# Patient Record
Sex: MALE | Race: WHITE | Employment: UNEMPLOYED | URBAN - METROPOLITAN AREA
[De-identification: names, ages, dates, MRNs, and addresses within clinical notes are randomized per-mention and may not be internally consistent; named-entity substitution may affect disease eponyms.]

---

## 2017-04-18 ENCOUNTER — OFFICE VISIT (OUTPATIENT)
Dept: FAMILY MEDICINE CLINIC | Age: 57
End: 2017-04-18

## 2017-04-18 VITALS
SYSTOLIC BLOOD PRESSURE: 155 MMHG | DIASTOLIC BLOOD PRESSURE: 87 MMHG | HEART RATE: 66 BPM | BODY MASS INDEX: 29.42 KG/M2 | WEIGHT: 223 LBS | TEMPERATURE: 98.3 F

## 2017-04-18 DIAGNOSIS — S99.921A RIGHT FOOT INJURY, INITIAL ENCOUNTER: Primary | ICD-10-CM

## 2017-04-18 NOTE — PROGRESS NOTES
Subjective:     Chief Complaint   Patient presents with    Foot Pain     pt c/o pain on right foot due to injury        He  is a 55 y.o. male who presents for evaluation of R foot pain s/p steel pipe that hit his foot approx 4-5 days ago after he   Ran his foot into it. Since then, Pt has noted a small knot that has formed on the top part of his foot. Notes swelling has improved. Pt has been taking Aleve 440mg TID and doing Epsom foot soaks. Pt notes normal walking is painful and has been walking on his heels. Notes Hx of plantar fascitis where he received injections, no prior surgeries on foot. PMH: a pinched nerve in his back     Surg: see CC     NKDA     ROS  Gen - no fever/chills  Resp - no dyspnea or cough  CV - no chest pain or WALKER  Rest per HPI    Past Medical History:   Diagnosis Date    Arthritis     Chronic pain      Past Surgical History:   Procedure Laterality Date    ABDOMEN SURGERY PROC UNLISTED      HX ENDOSCOPY      HX HEENT      FACE-TRAUMA    HX OTHER SURGICAL      inguinal hernia     Current Outpatient Prescriptions on File Prior to Visit   Medication Sig Dispense Refill    predniSONE (STERAPRED DS) 10 mg dose pack Take as directed, start tomorrow 21 Tab 0    albuterol (VENTOLIN HFA) 90 mcg/actuation inhaler Take 2 Puffs by inhalation every six (6) hours as needed (until cough resolves). 1 Inhaler 0    cyanocobalamin 1,000 mcg tablet Take 1,000 mcg by mouth daily. No current facility-administered medications on file prior to visit.          Objective:     Vitals:    04/18/17 1026 04/18/17 1030   BP: (!) 151/100 155/87   Pulse: 67 66   Temp: 98.3 °F (36.8 °C)    TempSrc: Oral    Weight: 223 lb (101.2 kg)        Physical Examination:  General appearance - alert, well appearing, and in no distress  Eyes -sclera anicteric  Neck - supple, no significant adenopathy, no thyromegaly  Chest - clear to auscultation, no wheezes, rales or rhonchi, symmetric air entry  Heart - normal rate, regular rhythm, normal S1, S2, no murmurs, rubs, clicks or gallops  Neurological - alert, oriented, no focal findings or movement disorder noted  R foot-ROM WNL, trace edema, 5cm hematoma noted on dorsal side, tender to palpation. Assessment/ Plan:   Patria Eli was seen today for foot pain. Diagnoses and all orders for this visit:    Right foot injury, initial encounter  -     XR FOOT RT MIN 3 V; Future       Discussed appropriate Naproxen dosing and corresponding effects on BP. Given type of injury and continued pain/weight bearing status, will refer Pt for plain film to rule out Fx. Discussed RICE and non-pharm symptom management. RTC PRN. I have discussed the diagnosis with the patient and the intended plan as seen in the above orders. The patient has received an after-visit summary and questions were answered concerning future plans. I have discussed medication side effects and warnings with the patient as well. The patient verbalizes understanding and agreement with the plan. Follow-up Disposition:  Return if symptoms worsen or fail to improve.

## 2017-04-18 NOTE — PROGRESS NOTES
Statements below were documented by Odilon Oswald RN Patient discharged home with AVS. No further questions. Information given regarding test ordered x-ray of right foot , address to [facility] given , and copy of order for test . Instructed to go to [facility] main entrance and then report to outpatient registration Patient given information about care card because the test ordered will be billed to patient . Patient explained to answer phone because a call would be coming in from [facility] to start the financial screening process fro Care Card. Patient verbalized understanding and has no questions . Patient explained that when bill is received to please call the number on the bill and explain that they are working on getting the care card. Patient acknowleged and understands the process without aany questions . Patient informed that if [facility] does not call to contact them in 3 weeks  to please give them a call . Acknowledged and verbalized understanding of information discussed at discharge .  Odilon Oswald RN

## 2017-05-03 ENCOUNTER — TELEPHONE (OUTPATIENT)
Dept: FAMILY MEDICINE CLINIC | Age: 57
End: 2017-05-03

## 2017-05-03 NOTE — TELEPHONE ENCOUNTER
Per Leocadia Flight, I called patient and told him he would have to come to the [facility] to be seen before we could sign off on paperwork.     Tennis Driscoll April

## 2017-05-03 NOTE — TELEPHONE ENCOUNTER
Patient said he is feeling much better,  Symptoms have gone. He needs to have Patrick complete a form, which he is going to fax to us saying he is okay to work.     Judit Atkins

## 2017-05-05 ENCOUNTER — OFFICE VISIT (OUTPATIENT)
Dept: FAMILY MEDICINE CLINIC | Age: 57
End: 2017-05-05

## 2017-05-05 VITALS
TEMPERATURE: 97.9 F | HEART RATE: 94 BPM | BODY MASS INDEX: 29.03 KG/M2 | SYSTOLIC BLOOD PRESSURE: 156 MMHG | DIASTOLIC BLOOD PRESSURE: 82 MMHG | WEIGHT: 220 LBS

## 2017-05-05 DIAGNOSIS — S99.921D FOOT INJURY, RIGHT, SUBSEQUENT ENCOUNTER: Primary | ICD-10-CM

## 2017-05-05 DIAGNOSIS — Z72.0 TOBACCO USE: ICD-10-CM

## 2017-05-05 DIAGNOSIS — I10 ESSENTIAL HYPERTENSION: ICD-10-CM

## 2017-05-05 NOTE — PATIENT INSTRUCTIONS
Learning About Benefits From Quitting Smoking  How does quitting smoking make you healthier? If you're thinking about quitting smoking, you may have a few reasons to be smoke-free. Your health may be one of them. · When you quit smoking, you lower your risks for cancer, lung disease, heart attack, stroke, blood vessel disease, and blindness from macular degeneration. · When you're smoke-free, you get sick less often, and you heal faster. You are less likely to get colds, flu, bronchitis, and pneumonia. · As a nonsmoker, you may find that your mood is better and you are less stressed. When and how will you feel healthier? Quitting has real health benefits that start from day 1 of being smoke-free. And the longer you stay smoke-free, the healthier you get and the better you feel. The first hours  · After just 20 minutes, your blood pressure and heart rate go down. That means there's less stress on your heart and blood vessels. · Within 12 hours, the level of carbon monoxide in your blood drops back to normal. That makes room for more oxygen. With more oxygen in your body, you may notice that you have more energy than when you smoked. After 2 weeks  · Your lungs start to work better. · Your risk of heart attack starts to drop. After 1 month  · When your lungs are clear, you cough less and breathe deeper, so it's easier to be active. · Your sense of taste and smell return. That means you can enjoy food more than you have since you started smoking. Over the years  · After 1 year, your risk of heart disease is half what it would be if you kept smoking. · After 5 years, your risk of stroke starts to shrink. Within a few years after that, it's about the same as if you'd never smoked. · After 10 years, your risk of dying from lung cancer is cut by about half. And your risk for many other types of cancer is lower too. How would quitting help others in your life?   When you quit smoking, you improve the health of everyone who now breathes in your smoke. · Their heart, lung, and cancer risks drop, much like yours. · They are sick less. For babies and small children, living smoke-free means they're less likely to have ear infections, pneumonia, and bronchitis. · If you're a woman who is or will be pregnant someday, quitting smoking means a healthier . · Children who are close to you are less likely to become adult smokers. Where can you learn more? Go to http://karli-elenita.info/. Enter 052 [id number] 72 11 in the search box to learn more about \"Learning About Benefits From Quitting Smoking. \"  Current as of: May 26, 2016  Content Version: 11.2  © 8954-4591 [employer]. Care instructions adapted under license by [employer] (which disclaims liability or warranty for this information). If you have questions about a medical condition or this instruction, always ask your healthcare professional. [place] [address] any warranty or liability for your use of this information. DASH Diet: Care Instructions  Your Care Instructions  The DASH diet is an eating plan that can help lower your blood pressure. DASH stands for Dietary Approaches to Stop Hypertension. Hypertension is high blood pressure. The DASH diet focuses on eating foods that are high in calcium, potassium, and magnesium. These nutrients can lower blood pressure. The foods that are highest in these nutrients are fruits, vegetables, low-fat dairy products, nuts, seeds, and legumes. But taking calcium, potassium, and magnesium supplements instead of eating foods that are high in those nutrients does not have the same effect. The DASH diet also includes whole grains, fish, and poultry. The DASH diet is one of several lifestyle changes your doctor may recommend to lower your high blood pressure. Your doctor may also want you to decrease the amount of sodium in your diet.  Lowering sodium while following the DASH diet can lower blood pressure even further than just the DASH diet alone. Follow-up care is a key part of your treatment and safety. Be sure to make and go to all appointments, and call your doctor if you are having problems. It's also a good idea to know your test results and keep a list of the medicines you take. How can you care for yourself at home? Following the DASH diet  · Eat 4 to 5 servings of fruit each day. A serving is 1 medium-sized piece of fruit, ½ cup chopped or canned fruit, 1/4 cup dried fruit, or 4 ounces (½ cup) of fruit juice. Choose fruit more often than fruit juice. · Eat 4 to 5 servings of vegetables each day. A serving is 1 cup of lettuce or raw leafy vegetables, ½ cup of chopped or cooked vegetables, or 4 ounces (½ cup) of vegetable juice. Choose vegetables more often than vegetable juice. · Get 2 to 3 servings of low-fat and fat-free dairy each day. A serving is 8 ounces of milk, 1 cup of yogurt, or 1 ½ ounces of cheese. · Eat 6 to 8 servings of grains each day. A serving is 1 slice of bread, 1 ounce of dry cereal, or ½ cup of cooked rice, pasta, or cooked cereal. Try to choose whole-grain products as much as possible. · Limit lean meat, poultry, and fish to 2 servings each day. A serving is 3 ounces, about the size of a deck of cards. · Eat 4 to 5 servings of nuts, seeds, and legumes (cooked dried beans, lentils, and split peas) each week. A serving is 1/3 cup of nuts, 2 tablespoons of seeds, or ½ cup of cooked beans or peas. · Limit fats and oils to 2 to 3 servings each day. A serving is 1 teaspoon of vegetable oil or 2 tablespoons of salad dressing. · Limit sweets and added sugars to 5 servings or less a week. A serving is 1 tablespoon jelly or jam, ½ cup sorbet, or 1 cup of lemonade. · Eat less than 2,300 milligrams (mg) of sodium a day. If you limit your sodium to 1,500 mg a day, you can lower your blood pressure even more. Tips for success  · Start small.  Do not try to make dramatic changes to your diet all at once. You might feel that you are missing out on your favorite foods and then be more likely to not follow the plan. Make small changes, and stick with them. Once those changes become habit, add a few more changes. · Try some of the following:  ¨ Make it a goal to eat a fruit or vegetable at every meal and at snacks. This will make it easy to get the recommended amount of fruits and vegetables each day. ¨ Try yogurt topped with fruit and nuts for a snack or healthy dessert. ¨ Add lettuce, tomato, cucumber, and onion to sandwiches. ¨ Combine a ready-made pizza crust with low-fat mozzarella cheese and lots of vegetable toppings. Try using tomatoes, squash, spinach, broccoli, carrots, cauliflower, and onions. ¨ Have a variety of cut-up vegetables with a low-fat dip as an appetizer instead of chips and dip. ¨ Sprinkle sunflower seeds or chopped almonds over salads. Or try adding chopped walnuts or almonds to cooked vegetables. ¨ Try some vegetarian meals using beans and peas. Add garbanzo or kidney beans to salads. Make burritos and tacos with mashed toro beans or black beans. Where can you learn more? Go to http://karli-elenita.info/. Enter F601 in the search box to learn more about \"DASH Diet: Care Instructions. \"  Current as of: March 23, 2016  Content Version: 11.2  © 9059-9477 [employer]. Care instructions adapted under license by [employer] (which disclaims liability or warranty for this information). If you have questions about a medical condition or this instruction, always ask your healthcare professional. [place] [address] any warranty or liability for your use of this information.

## 2017-05-05 NOTE — PROGRESS NOTES
The following was performed at discharge station per provider:    AVS printed, reviewed with pt and given to pt. Copied [place] [employer] form for scanning to the chart. Referred pt to the registrar to make an appt for follow up in 6-8 weeks, and reminded pt to come one week prior to this appt for fasting labs. Pt stated that he hopes to have a job with insurance soon so he does not anticipate coming back to [facility]. Pt expressed understanding of the above information. Fernando Bettencourt.

## 2017-05-05 NOTE — PROGRESS NOTES
Subjective:     Chief Complaint   Patient presents with    Employment Physical        He  is a 55 y.o. male who presents for evaluation of R foot pain and employment physical.     Since LOV, Pt notes hisR foot pain and function has improved x one week. 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 Has brought a form he needs filled out for unemployment stating he can work. No Hx of HTN Tx. Continues to smoke approx 1/2 PPD. May get insurance, has several job interviews lined up. ROS  Gen - no fever/chills  Resp - no dyspnea or cough  CV - no chest pain or WALKER  Rest per HPI    Past Medical History:   Diagnosis Date    Arthritis     Chronic pain      Past Surgical History:   Procedure Laterality Date    ABDOMEN SURGERY PROC UNLISTED      HX ENDOSCOPY      HX HEENT      FACE-TRAUMA    HX OTHER SURGICAL      inguinal hernia     Current Outpatient Prescriptions on File Prior to Visit   Medication Sig Dispense Refill    albuterol (VENTOLIN HFA) 90 mcg/actuation inhaler Take 2 Puffs by inhalation every six (6) hours as needed (until cough resolves). 1 Inhaler 0    cyanocobalamin 1,000 mcg tablet Take 1,000 mcg by mouth daily. No current facility-administered medications on file prior to visit.          Objective:     Vitals:    05/05/17 0849   BP: 156/82   Pulse: 94   Temp: 97.9 °F (36.6 °C)   TempSrc: Oral   Weight: 220 lb (99.8 kg)       Physical Examination:  General appearance - alert, well appearing, and in no distress  Eyes -sclera anicteric  Neck - supple, no significant adenopathy, no thyromegaly  Chest - clear to auscultation, no wheezes, rales or rhonchi, symmetric air entry  Heart - normal rate, regular rhythm, normal S1, S2, no murmurs, rubs, clicks or gallops  Neurological - alert, oriented, no focal findings or movement disorder noted    R foot-ROM WNL, no edema, no palpable abnormalities, gait WNL      Assessment/ Plan:   Aydee Barnes was seen today for employment physical.    Diagnoses and all orders for this visit:    Foot injury, right, subsequent encounter    Essential hypertension     Completed form stating Pt could work given his improvement, exam and Hx. Recommend Pt check BP at home BID x 1-2 weeks, bring reading to next visit. Start daily ASA and counseled on lowering CV risk w/ diet and lifestyle changes. RTC 1 week prior to f/u visit for fasting labs. Labs in 2015 showed pre-DM, normal renal function. Advised to cancel appt if his insurance status changes. Pt expressed understanding of the treatment plan and repeated back instructions for medications, labs  and follow-up. I have discussed the diagnosis with the patient and the intended plan as seen in the above orders. The patient has received an after-visit summary and questions were answered concerning future plans. I have discussed medication side effects and warnings with the patient as well. The patient verbalizes understanding and agreement with the plan.     Follow-up Disposition: Not on File

## 2017-05-05 NOTE — PROGRESS NOTES
Coordination of Care  1. Have you been to the ER, urgent care clinic since your last visit? Hospitalized since your last visit? No    2. Have you seen or consulted any other health care providers outside of the [facility] since your last visit? Include any pap smears or colon screening. No    Medications  Medication Reconciliation Performed: no  Patient does need refills     Learning Assessment Complete?  yes

## 2017-05-08 NOTE — PROGRESS NOTES
Completed form faxed to Mrs Yasmin Kwong @[phone] from [facility] office . Pt notified via phone call and states he also did fax on Friday after the office visit.

## 2017-06-27 ENCOUNTER — HOSPITAL ENCOUNTER (OUTPATIENT)
Dept: LAB | Age: 57
Discharge: HOME | End: 2017-06-27

## 2017-06-27 ENCOUNTER — OFFICE VISIT (OUTPATIENT)
Dept: FAMILY MEDICINE CLINIC | Age: 57
End: 2017-06-27

## 2017-06-27 VITALS
HEART RATE: 72 BPM | HEIGHT: 72 IN | SYSTOLIC BLOOD PRESSURE: 135 MMHG | TEMPERATURE: 98.5 F | WEIGHT: 226.8 LBS | BODY MASS INDEX: 30.72 KG/M2 | DIASTOLIC BLOOD PRESSURE: 76 MMHG

## 2017-06-27 DIAGNOSIS — R03.0 ELEVATED BLOOD PRESSURE READING: Primary | ICD-10-CM

## 2017-06-27 DIAGNOSIS — R03.0 ELEVATED BLOOD PRESSURE READING: ICD-10-CM

## 2017-06-27 DIAGNOSIS — M54.50 LOW BACK PAIN RADIATING TO LEFT LEG: ICD-10-CM

## 2017-06-27 DIAGNOSIS — M79.605 LOW BACK PAIN RADIATING TO LEFT LEG: ICD-10-CM

## 2017-06-27 LAB
ALBUMIN SERPL BCP-MCNC: 4 G/DL (ref 3.5–5)
ALBUMIN/GLOB SERPL: 1.3 {RATIO} (ref 1.1–2.2)
ALP SERPL-CCNC: 102 U/L (ref 45–117)
ALT SERPL-CCNC: 84 U/L (ref 12–78)
ANION GAP BLD CALC-SCNC: 9 MMOL/L (ref 5–15)
AST SERPL W P-5'-P-CCNC: 67 U/L (ref 15–37)
BILIRUB SERPL-MCNC: 0.7 MG/DL (ref 0.2–1)
BUN SERPL-MCNC: 16 MG/DL (ref 6–20)
BUN/CREAT SERPL: 18 (ref 12–20)
CALCIUM SERPL-MCNC: 8.9 MG/DL (ref 8.5–10.1)
CHLORIDE SERPL-SCNC: 99 MMOL/L (ref 97–108)
CO2 SERPL-SCNC: 27 MMOL/L (ref 21–32)
CREAT SERPL-MCNC: 0.89 MG/DL (ref 0.7–1.3)
GLOBULIN SER CALC-MCNC: 3 G/DL (ref 2–4)
GLUCOSE SERPL-MCNC: 114 MG/DL (ref 65–100)
POTASSIUM SERPL-SCNC: 4.8 MMOL/L (ref 3.5–5.1)
PROT SERPL-MCNC: 7 G/DL (ref 6.4–8.2)
SODIUM SERPL-SCNC: 135 MMOL/L (ref 136–145)

## 2017-06-27 PROCEDURE — 80053 COMPREHEN METABOLIC PANEL: CPT | Performed by: NURSE PRACTITIONER

## 2017-06-27 RX ORDER — DULOXETIN HYDROCHLORIDE 60 MG/1
60 CAPSULE, DELAYED RELEASE ORAL DAILY
Qty: 30 CAP | Refills: 0 | Status: SHIPPED | OUTPATIENT
Start: 2017-06-27 | End: 2017-06-27 | Stop reason: SDUPTHER

## 2017-06-27 RX ORDER — DULOXETIN HYDROCHLORIDE 60 MG/1
60 CAPSULE, DELAYED RELEASE ORAL DAILY
Qty: 30 CAP | Refills: 0 | Status: SHIPPED | OUTPATIENT
Start: 2017-06-27 | End: 2017-08-19 | Stop reason: SDUPTHER

## 2017-06-27 NOTE — PROGRESS NOTES
At discharge station AVS was printed and reviewed with pt. I gave pt Good Rx coupon and also rerouted RX to [facility] as price was much cheaper than the [address] I also gave pt list of imaging centers for Xray. Pt taken to registration to make f/up appointment with PA at [facility] for ortho follow up in about 4 weeks.  Matt Pinto RN

## 2017-06-27 NOTE — PROGRESS NOTES
Coordination of Care  1. Have you been to the ER, urgent care clinic since your last visit? Hospitalized since your last visit? No    2. Have you seen or consulted any other health care providers outside of the [address] [place] [address] since your last visit? Include any pap smears or colon screening. No    Medications  Medication Reconciliation Performed: no  Patient does not need refills     Learning Assessment Complete?  yes

## 2017-06-27 NOTE — PROGRESS NOTES
Assessment/Plan:       ICD-10-CM ICD-9-CM    1. Elevated blood pressure reading G18.1 094.3 METABOLIC PANEL, COMPREHENSIVE   2. Low back pain radiating to left leg M54.5 724.2 XR SPINE LUMB MIN 4 V      DULoxetine (CYMBALTA) 60 mg capsule      DISCONTINUED: DULoxetine (CYMBALTA) 60 mg capsule       [facility]  Subjective:     Chief Complaint   Patient presents with    Back Pain     lower back pain x2 weeks, numbness and pain to left leg. Maricruz Irene is a 55 y.o. WHITE OR  male. HPI: sharp, shooting pain. Taking ibuprofen and beer. 3 yrs ago spinal stenosis; had surgery. Had pain in similar area, worse every day. Had surgery at [facility]. L3-S1 lumbar laminectomy 2015. Pain low back, heads to gluts, down back of leg, leg cramps. He  has a past medical history of Arthritis and Chronic pain. He has Essential hypertension and Tobacco use on his problem list.   Review of Systems: Negative for: fever, chest pain, shortness of breath, leg swelling, exertional dyspnea, palpitations. Current Medications:   Current Outpatient Prescriptions on File Prior to Visit   Medication Sig    albuterol (VENTOLIN HFA) 90 mcg/actuation inhaler Take 2 Puffs by inhalation every six (6) hours as needed (until cough resolves).  cyanocobalamin 1,000 mcg tablet Take 1,000 mcg by mouth daily. No current facility-administered medications on file prior to visit. Past Surgical History: He  has a past surgical history that includes other surgical; abdomen surgery proc unlisted; heent; and endoscopy. Social and Family History: He  reports that he has been smoking. He has never used smokeless tobacco. He reports that he drinks alcohol. He reports that he does not use illicit drugs. ; family history includes Arthritis-osteo in his father; Other in his mother. There is no history of Anesth Problems. Drinks 6 beers a day. Ice house is a 6.9 beer. Steel reserve 8.1% alcohol.   He doesn't think this is a problem or is excessive; his wife (next patient) does. Objective:     Vitals:    06/27/17 0915   BP: 135/76   Pulse: 72   Temp: 98.5 °F (36.9 °C)   TempSrc: Oral   Weight: 226 lb 12.8 oz (102.9 kg)   Height: 5' 11.65\" (1.82 m)    No LMP for male patient. Wt Readings from Last 2 Encounters:   06/27/17 226 lb 12.8 oz (102.9 kg)   05/05/17 220 lb (99.8 kg)     No results found for any visits on 06/27/17. Physical Examination: able to walk on heels/toes. Pain localized to left low buttock. SLR negative bilaterally. General appearance - well developed, no acute distress. Chest - clear to auscultation. Heart - regular rate and rhythm without murmurs, rubs, or gallops. Abdomen - bowel sounds present x 4, NT, ND. Extremities - pulses intact. No peripheral edema. Assessment/Plan:   Geoffrey Rico was seen today for back pain. Diagnoses and all orders for this visit:    Elevated blood pressure reading  -     METABOLIC PANEL, COMPREHENSIVE; Future    Low back pain radiating to left leg  -     XR SPINE LUMB MIN 4 V; Future  -     Discontinue: DULoxetine (CYMBALTA) 60 mg capsule; Take 1 Cap by mouth daily. For nerve-related back/leg pain  -     DULoxetine (CYMBALTA) 60 mg capsule; Take 1 Cap by mouth daily. For nerve-related back/leg pain      Follow-up Disposition:  Return in about 4 weeks (around 7/25/2017) for appointment with KATERIN Thurston for possible injection. In the 1980s had injections, steroid. Neurontin did not work for him. To try Cymbalta  -may need steroid injection (buttock as point of greatest tenderness) from [facility], [place]  He has been in [place] since 1975   l-spine x-ray to see ortho  AA or counseling for EtOH if he would want to try these  Bri Jones, DELORES, FNP-BC, BC-ADM  Gamal Luz expressed understanding of this plan.

## 2017-06-27 NOTE — MR AVS SNAPSHOT
Visit Information Date & Time Provider Department Dept. Phone Encounter #  
 6/27/2017  8:45 AM Jose Cutler NP [facility] [address] [phone] [id number] Follow-up Instructions Return in about 4 weeks (around 7/25/2017) for appointment with KATERIN Cornelius for possible injection. Upcoming Health Maintenance Date Due Hepatitis C Screening 1960 Pneumococcal 19-64 Medium Risk (1 of 1 - PPSV23) 7/4/1980 DTaP/Tdap/Td series (1 - Tdap) 7/4/1982 FOBT Q 1 YEAR AGE 50-75 7/4/2011 INFLUENZA AGE 9 TO ADULT 8/1/2017 Allergies as of 6/27/2017  Review Complete On: 6/27/2017 By: Jose Cutler NP No Known Allergies Current Immunizations  Never Reviewed No immunizations on file. Not reviewed this visit You Were Diagnosed With   
  
 Codes Comments Elevated blood pressure reading    -  Primary ICD-10-CM: R03.0 ICD-9-CM: 796.2 Low back pain radiating to left leg     ICD-10-CM: M54.5 ICD-9-CM: 724.2 Vitals BP Pulse Temp Height(growth percentile) Weight(growth percentile) BMI  
 135/76 (BP 1 Location: Left arm) 72 98.5 °F (36.9 °C) (Oral) 5' 11.65\" (1.82 m) 226 lb 12.8 oz (102.9 kg) 31.06 kg/m2 Smoking Status Current Every Day Smoker Vitals History BMI and BSA Data Body Mass Index Body Surface Area 31.06 kg/m 2 2.28 m 2 Preferred Pharmacy Pharmacy Name Phone [facility], [address] [phone] Your Updated Medication List  
  
   
This list is accurate as of: 6/27/17 10:22 AM.  Always use your most recent med list.  
  
  
  
  
 albuterol 90 mcg/actuation inhaler Commonly known as:  VENTOLIN HFA Take 2 Puffs by inhalation every six (6) hours as needed (until cough resolves). cyanocobalamin 1,000 mcg tablet Take 1,000 mcg by mouth daily. DULoxetine 60 mg capsule Commonly known as:  CYMBALTA Take 1 Cap by mouth daily. For nerve-related back/leg pain Prescriptions Sent to Pharmacy Refills DULoxetine (CYMBALTA) 60 mg capsule 0 Sig: Take 1 Cap by mouth daily. For nerve-related back/leg pain  
 Class: Normal  
 Pharmacy: [facility] [address] [place] [address], [address] [place] [address] #: [phone] Route: Oral  
  
Follow-up Instructions Return in about 4 weeks (around 7/25/2017) for appointment with KATERIN Roque for possible injection. To-Do List   
 06/27/2017 Imaging:  XR SPINE LUMB MIN 4 V Introducing [facility]! Caio Meredith introduces Yvolver patient portal. Now you can access parts of your medical record, email your doctor's office, and request medication refills online. 1. In your internet browser, go to [email or web address] 2. Click on the First Time User? Click Here link in the Sign In box. You will see the New Member Sign Up page. 3. Enter your Yvolver Access Code exactly as it appears below. You will not need to use this code after youve completed the sign-up process. If you do not sign up before the expiration date, you must request a new code. · Yvolver Access Code: [email or web address] Expires: 9/25/2017 10:22 AM 
 
4. Enter the last four digits of your Social Security Number (xxxx) and Date of Birth (mm/dd/yyyy) as indicated and click Submit. You will be taken to the next sign-up page. 5. Create a Tinker Gamest ID. This will be your Yvolver login ID and cannot be changed, so think of one that is secure and easy to remember. 6. Create a [employer] password. You can change your password at any time. 7. Enter your Password Reset Question and Answer. This can be used at a later time if you forget your password. 8. Enter your e-mail address. You will receive e-mail notification when new information is available in [address]. 9. Click Sign Up. You can now view and download portions of your medical record. 10. Click the Download Summary menu link to download a portable copy of your medical information. If you have questions, please visit the Frequently Asked Questions section of the [employer] website. Remember, [employer] is NOT to be used for urgent needs. For medical emergencies, dial 911. Now available from your iPhone and Android! Please provide this summary of care documentation to your next provider. Your primary care clinician is listed as NONE. If you have any questions after today's visit, please call [phone].

## 2017-06-28 ENCOUNTER — TELEPHONE (OUTPATIENT)
Dept: FAMILY MEDICINE CLINIC | Age: 57
End: 2017-06-28

## 2017-06-28 ENCOUNTER — OFFICE VISIT (OUTPATIENT)
Dept: FAMILY MEDICINE CLINIC | Age: 57
End: 2017-06-28

## 2017-06-28 VITALS
OXYGEN SATURATION: 96 % | TEMPERATURE: 97.8 F | DIASTOLIC BLOOD PRESSURE: 97 MMHG | HEART RATE: 91 BPM | SYSTOLIC BLOOD PRESSURE: 155 MMHG

## 2017-06-28 DIAGNOSIS — R74.8 ELEVATED LIVER ENZYMES: Primary | ICD-10-CM

## 2017-06-28 DIAGNOSIS — M47.27 OSTEOARTHRITIS OF SPINE WITH RADICULOPATHY, LUMBOSACRAL REGION: Primary | ICD-10-CM

## 2017-06-28 NOTE — PROGRESS NOTES
Statements below were documented by Racquel Ray RN Printed sacroiliac exercises instructions for patient . Previous order for x-rays . \" States I will be going to [facility] to have test completed today . Patient given information about care card because the test ordered will be billed to patient . Patient explained to answer phone because a call would be coming in from [facility] to start the financial screening process fro Care Card. Patient verbalized understanding and has no questions . Patient explained that when bill is received to please call the number on the bill and explain that they are working on getting the care card. Patient acknowleged and understands the process without any questions . Patient informed that if [facility] does not call to contact them in 3 weeks  to please give them a call. Verbalized understanding of instructions .  Racquel Ray RN

## 2017-06-28 NOTE — TELEPHONE ENCOUNTER
Telephone call to patient. Discussed elevated liver enzymes, that I recommend no beer at this time, recheck in 6 weeks. No questions at close of call.

## 2017-06-28 NOTE — PROGRESS NOTES
Liver enzymes are elevated, a sign of damage from drinking beer. I recommend he stop completely, and we can recheck his labs in 6 weeks.

## 2017-06-29 ENCOUNTER — HOSPITAL ENCOUNTER (OUTPATIENT)
Dept: GENERAL RADIOLOGY | Age: 57
Discharge: HOME | End: 2017-06-29
Attending: NURSE PRACTITIONER
Payer: SELF-PAY

## 2017-06-29 DIAGNOSIS — M54.50 LOW BACK PAIN RADIATING TO LEFT LEG: ICD-10-CM

## 2017-06-29 DIAGNOSIS — M79.605 LOW BACK PAIN RADIATING TO LEFT LEG: ICD-10-CM

## 2017-06-29 PROCEDURE — 72100 X-RAY EXAM L-S SPINE 2/3 VWS: CPT

## 2017-06-29 NOTE — PROGRESS NOTES
Attempted to reach patient, no answer, left vm to call [facility] office and speak to a nurse re: lab results.

## 2017-06-30 ENCOUNTER — TELEPHONE (OUTPATIENT)
Dept: FAMILY MEDICINE CLINIC | Age: 57
End: 2017-06-30

## 2017-06-30 NOTE — TELEPHONE ENCOUNTER
Spoke to patient, thought he needed to know lab results but he said Garry Almeida had already reviewed them with her, he thought we were calling about the xray results, did share Chery's remark about the narrowing discs with patient. Patient does want to know what the next steps are in this POC from [facility] please.

## 2017-06-30 NOTE — TELEPHONE ENCOUNTER
Return call made to the patient. A message was left on his home/cell phone number that the [facility]AV was returning his call.  Cadence Jesus RN

## 2017-07-05 ENCOUNTER — TELEPHONE (OUTPATIENT)
Dept: FAMILY MEDICINE CLINIC | Age: 57
End: 2017-07-05

## 2017-07-05 NOTE — TELEPHONE ENCOUNTER
Telephone call received from the patient. He would like to know the results of his recent xray and what the plan is for him . Explained to him that the provider has reviewed the xray but I do not have information on the next step in his plan of care. Will route to the provider for clarification. The patient understands that the [facility] will call him once we have received direction from the provider.  Leo Hamilton RN

## 2017-07-05 NOTE — TELEPHONE ENCOUNTER
Plan was to see orthopedist Wyatt Mcbride and to try Cymbalta.   Has he made the appointment to see the orthopedist?

## 2017-07-05 NOTE — TELEPHONE ENCOUNTER
Alfa Gordon is having two clinics in July which are already booked. He is not coming to the [facility] in August. His schedule for September is not available yet. The patient does not have a follow-up appointment with Harlan Fair at this time. I can create an appointment tickler in hopes that he can be seen by him in September.  Heidi Justice RN

## 2017-07-05 NOTE — TELEPHONE ENCOUNTER
Karon Cisneros September schedule is now available. I called the patient to review the provider's xray result note and plan for him to try Cymbalta and follow-up with Timmy Day. We scheduled an appointment with Gem Aldridge for 09-27-17 at [address] [place] [address] at [address] [place] [address]. The patient stated that Gem Aldridge gave him prescriptions for Prednisone and Hydrocodone which helped greatly and which he is now out of. He also stated that the Cymbalta made him nauseous and shaky and therefore he only took it three times. He was feeling better until today when the back pain returned. He would like a prescription please. Routing to the provider for review.  Radha Gandhi RN

## 2017-07-08 PROBLEM — M47.27 OSTEOARTHRITIS OF SPINE WITH RADICULOPATHY, LUMBOSACRAL REGION: Status: ACTIVE | Noted: 2017-07-08

## 2017-07-08 PROBLEM — M53.87 SCIATICA OF LEFT SIDE ASSOCIATED WITH DISORDER OF LUMBOSACRAL SPINE: Status: ACTIVE | Noted: 2017-07-08

## 2017-07-08 NOTE — PROGRESS NOTES
ORTHO CONSULT NOTE    Subjective:     Date of Consultation:  July 8, 2017    Macario Silva is a 65 y.o. male who is being seen for left sided buttock and leg pain, with occasional low back pain. Has a H/O of lumber decompression by Dr. Dariel Menendez ([address]) in 2015 due to spinal stenosis. Pt unable to recall specific level. Had been doing well until December 2016 when he had some coughing fits, and then again noticed after pruning back some bushes several weeks ago. Standing and activity worsen, walking is OK. Denies any W/B in the LE's, mainly pain, with occasional numbness in tingling in the LLE, no loss of bowel or bladder control, saddle anesthesia, fever, chills, or night pain. Was seen yesterday by Ms. Gracie Umana who prescribed Cymbalta and was only able to take one, was unable to tolerate. H/O prior neurontin use which wasn't able to tolerate either. Is requesting something stronger for pain instead of NSAID's, or tylenol which do not help. Also some leg cramps at night. Patient Active Problem List    Diagnosis Date Noted    Sciatica of left side associated with disorder of lumbosacral spine 07/08/2017    Essential hypertension 05/05/2017    Tobacco use 05/05/2017     Family History   Problem Relation Age of Onset    Other Mother      [facility] Arthritis-osteo Father     Anesth Problems Neg Hx       Social History   Substance Use Topics    Smoking status: Current Every Day Smoker    Smokeless tobacco: Never Used    Alcohol use Yes      Comment: OCCASIONAL     Past Medical History:   Diagnosis Date    Arthritis     Chronic pain       Past Surgical History:   Procedure Laterality Date    ABDOMEN SURGERY PROC UNLISTED      HX ENDOSCOPY      HX HEENT      FACE-TRAUMA    HX OTHER SURGICAL      inguinal hernia      Prior to Admission medications    Medication Sig Start Date End Date Taking? Authorizing Provider   DULoxetine (CYMBALTA) 60 mg capsule Take 1 Cap by mouth daily.  For nerve-related back/leg pain 6/27/17   Jarred Mishra NP     Current Outpatient Prescriptions   Medication Sig    DULoxetine (CYMBALTA) 60 mg capsule Take 1 Cap by mouth daily. For nerve-related back/leg pain     No current facility-administered medications for this visit. No Known Allergies     Review of Systems:  A comprehensive review of systems was negative except for that written in the HPI. Mental Status:  Alert and oriented      Objective:     Exam: alert, cooperative, no distress, appears stated age,    Neuro: no focal deficits. Extremities: Gait is minimally antalgic favoring the left side, able to heel and toe walk. Quad strength 5/5, DTR's 2/4 and symmetrical, 2-3 beat clonus. SLR's in supine and sitting negative for pain at 90 degrees with patient distracted. Imaging Review: EXAM:  XR SPINE LUMB 2 OR 3 V     INDICATION:   low back pain on the left radiating to buttock and lower  extremity, since November 2016, prior back surgery. M 55. 5.     COMPARISON: MRI 3/27/2015     FINDINGS: AP, lateral and spot lateral views of the lumbar spine demonstrate  normal bone mineralization. There is minimal wedge-shaped contour of the T12  vertebral body with otherwise normal vertebral body height. There is borderline  retrolisthesis again shown at L3-4 with otherwise anatomic alignment. Mild  lower thoracic, moderate to severe T12-L1, mild-moderate L3-4 and L4-5, and  severe L5-S1 disc space narrowing is demonstrated. There are L4 and L5  laminectomy defects. Substantial facet osteoarthrosis is suggested bilaterally  at L4-5 and L5-S1. No pedicle asymmetry is shown. The visualized portions of the  sacrum and SI joints appear within normal limits.      IMPRESSION  IMPRESSION:  Postoperative and degenerative findings as above      Plan:   Pt could have emergence of new left sided HNP at L5-S1, vs DDD and neuroforaminal stenosis (pinched nerve) which could be causing his symptoms. He is NV intact.   I reviewed many PT type exercises which should help. I did provide him with 15 , 5 mg Oxycodone's for breakthrough pain. Medrol dose-olivia as well. Ice/heat  Should he remain symptomatic in spite of adhering to above recommendations he may be a candidate for epidural steroids (DOMENICA). He should f/u with his spine surgeon, Dr. Johanna Prado for further diagnostic and therapeutic recommendations.  If not available to him, DOMENICA's        Quince Riding, [place]

## 2017-08-19 DIAGNOSIS — M79.605 LOW BACK PAIN RADIATING TO LEFT LEG: ICD-10-CM

## 2017-08-19 DIAGNOSIS — M54.50 LOW BACK PAIN RADIATING TO LEFT LEG: ICD-10-CM

## 2017-08-20 RX ORDER — DULOXETIN HYDROCHLORIDE 60 MG/1
CAPSULE, DELAYED RELEASE ORAL
Qty: 30 CAP | Refills: 0 | Status: SHIPPED | OUTPATIENT
Start: 2017-08-20 | End: 2017-09-27

## 2019-03-31 ENCOUNTER — APPOINTMENT (OUTPATIENT)
Dept: CT IMAGING | Age: 59
End: 2019-03-31
Attending: PHYSICIAN ASSISTANT
Payer: COMMERCIAL

## 2019-03-31 ENCOUNTER — HOSPITAL ENCOUNTER (EMERGENCY)
Age: 59
Discharge: HOME | End: 2019-04-01
Attending: EMERGENCY MEDICINE
Payer: COMMERCIAL

## 2019-03-31 VITALS
RESPIRATION RATE: 20 BRPM | TEMPERATURE: 97.8 F | WEIGHT: 220 LBS | HEART RATE: 78 BPM | SYSTOLIC BLOOD PRESSURE: 146 MMHG | BODY MASS INDEX: 29.16 KG/M2 | HEIGHT: 73 IN | OXYGEN SATURATION: 94 % | DIASTOLIC BLOOD PRESSURE: 64 MMHG

## 2019-03-31 DIAGNOSIS — S09.90XA INJURY OF HEAD, INITIAL ENCOUNTER: Primary | ICD-10-CM

## 2019-03-31 DIAGNOSIS — S01.81XA FACIAL LACERATION, INITIAL ENCOUNTER: ICD-10-CM

## 2019-03-31 PROCEDURE — 74011250637 HC RX REV CODE- 250/637: Performed by: PHYSICIAN ASSISTANT

## 2019-03-31 PROCEDURE — 99283 EMERGENCY DEPT VISIT LOW MDM: CPT

## 2019-03-31 PROCEDURE — 77030031132 HC SUT NYL COVD -A

## 2019-03-31 PROCEDURE — 75810000293 HC SIMP/SUPERF WND  RPR

## 2019-03-31 PROCEDURE — 74011000250 HC RX REV CODE- 250: Performed by: PHYSICIAN ASSISTANT

## 2019-03-31 PROCEDURE — 70450 CT HEAD/BRAIN W/O DYE: CPT

## 2019-03-31 RX ORDER — BUTALBITAL, ACETAMINOPHEN AND CAFFEINE 300; 40; 50 MG/1; MG/1; MG/1
1 CAPSULE ORAL
Qty: 10 CAP | Refills: 0 | Status: SHIPPED | OUTPATIENT
Start: 2019-03-31

## 2019-03-31 RX ORDER — BUTALBITAL, ACETAMINOPHEN AND CAFFEINE 50; 325; 40 MG/1; MG/1; MG/1
1 TABLET ORAL
Status: COMPLETED | OUTPATIENT
Start: 2019-03-31 | End: 2019-03-31

## 2019-03-31 RX ORDER — IBUPROFEN 600 MG/1
600 TABLET ORAL
Status: DISCONTINUED | OUTPATIENT
Start: 2019-03-31 | End: 2019-03-31

## 2019-03-31 RX ADMIN — Medication 2 ML: at 22:45

## 2019-03-31 RX ADMIN — BUTALBITAL, ACETAMINOPHEN, AND CAFFEINE 1 TABLET: 50; 325; 40 TABLET ORAL at 23:54

## 2019-03-31 NOTE — LETTER
[address] [place] [address] Dr 
[facility] EMERGENCY DEPT 
[address] [place] [address] Terrell Anders 99 [id number] [phone] Work/School Note Date: 3/31/2019 To Whom It May concern: 
 
Jose E Granados was seen and treated today in the emergency room by the following provider(s): 
Attending Provider: Amanda King DO Physician Assistant: KATERIN Diallo. Jose E Granados may return to work on 4/2/19.  
 
Sincerely, 
 
 
 
 
KATERIN Hull

## 2019-04-01 NOTE — DISCHARGE INSTRUCTIONS
Patient Education        Learning About a Closed Head Injury  What is a closed head injury? A closed head injury happens when your head gets hit hard. The strong force of the blow causes your brain to shake in your skull. This movement can cause the brain to bruise, swell, or tear. Sometimes nerves or blood vessels also get damaged. This can cause bleeding in or around the brain. A concussion is a type of closed head injury. What are the symptoms? If you have a mild concussion, you may have a mild headache or feel \"not quite right. \" These symptoms are common. They usually go away over a few days to 4 weeks. But sometimes after a concussion, you feel like you can't function as well as before the injury. And you have new symptoms. This is called postconcussive syndrome. You may:  · Find it harder to solve problems, think, concentrate, or remember. · Have headaches. · Have changes in your sleep patterns, such as not being able to sleep or sleeping all the time. · Have changes in your personality. · Not be interested in your usual activities. · Feel angry or anxious without a clear reason. · Lose your sense of taste or smell. · Be dizzy, lightheaded, or unsteady. It may be hard to stand or walk. How is a closed head injury treated? Any person who may have a concussion needs to see a doctor. Some people have to stay in the hospital to be watched. Others can go home safely. If you go home, follow your doctor's instructions. He or she will tell you if you need someone to watch you closely for the next 24 hours or longer. Rest is the best treatment. Get plenty of sleep at night. And try to rest during the day. · Avoid activities that are physically or mentally demanding. These include housework, exercise, and schoolwork. And don't play video games, send text messages, or use the computer. You may need to change your school or work schedule to be able to avoid these activities.   · Ask your doctor when it's okay to drive, ride a bike, or operate machinery. · Take an over-the-counter pain medicine, such as acetaminophen (Tylenol), ibuprofen (Advil, Motrin), or naproxen (Aleve). Be safe with medicines. Read and follow all instructions on the label. · Check with your doctor before you use any other medicines for pain. · Do not drink alcohol or use illegal drugs. They can slow recovery. They can also increase your risk of getting a second head injury. Follow-up care is a key part of your treatment and safety. Be sure to make and go to all appointments, and call your doctor if you are having problems. It's also a good idea to know your test results and keep a list of the medicines you take. Where can you learn more? Go to http://karli-elenita.info/. Enter E235 in the search box to learn more about \"Learning About a Closed Head Injury. \"  Current as of: Rosana 3, 2018  Content Version: 11.9  © 0543-9349 [employer]. Care instructions adapted under license by [employer] (which disclaims liability or warranty for this information). If you have questions about a medical condition or this instruction, always ask your healthcare professional. [place] [address] any warranty or liability for your use of this information. Patient Education        Cuts Closed With Stitches: Care Instructions  Your Care Instructions  A cut can happen anywhere on your body. The doctor used stitches to close the cut. Using stitches also helps the cut heal and reduces scarring. Sometimes pieces of tape called Steri-Strips are put over the stitches. If the cut went deep and through the skin, the doctor may have put in two layers of stitches. The deeper layer brings the deep part of the cut together. These stitches will dissolve and don't need to be removed. The stitches in the upper layer are the ones you see on the cut. You will probably have a bandage over the stitches.  You will need to have the stitches removed, usually in 7 to 14 days. The doctor has checked you carefully, but problems can develop later. If you notice any problems or new symptoms, get medical treatment right away. Follow-up care is a key part of your treatment and safety. Be sure to make and go to all appointments, and call your doctor if you are having problems. It's also a good idea to know your test results and keep a list of the medicines you take. How can you care for yourself at home? · Keep the cut dry for the first 24 to 48 hours. After this, you can shower if your doctor okays it. Pat the cut dry. · Don't soak the cut, such as in a bathtub. Your doctor will tell you when it's safe to get the cut wet. · If your doctor told you how to care for your cut, follow your doctor's instructions. If you did not get instructions, follow this general advice:  ? After the first 24 to 48 hours, wash around the cut with clean water 2 times a day. Don't use hydrogen peroxide or alcohol, which can slow healing. ? You may cover the cut with a thin layer of petroleum jelly, such as Vaseline, and a nonstick bandage. ? Apply more petroleum jelly and replace the bandage as needed. · Prop up the sore area on a pillow anytime you sit or lie down during the next 3 days. Try to keep it above the level of your heart. This will help reduce swelling. · Avoid any activity that could cause your cut to reopen. · Do not remove the stitches on your own. Your doctor will tell you when to come back to have the stitches removed. · Leave Steri-Strips on until they fall off. · Be safe with medicines. Read and follow all instructions on the label. ? If the doctor gave you a prescription medicine for pain, take it as prescribed. ? If you are not taking a prescription pain medicine, ask your doctor if you can take an over-the-counter medicine. When should you call for help?   Call your doctor now or seek immediate medical care if:    · You have new pain, or your pain gets worse.     · The skin near the cut is cold or pale or changes color.     · You have tingling, weakness, or numbness near the cut.     · The cut starts to bleed, and blood soaks through the bandage. Oozing small amounts of blood is normal.     · You have trouble moving the area near the cut.     · You have symptoms of infection, such as:  ? Increased pain, swelling, warmth, or redness around the cut.  ? Red streaks leading from the cut.  ? Pus draining from the cut.  ? A fever.    Watch closely for changes in your health, and be sure to contact your doctor if:    · The cut reopens.     · You do not get better as expected. Where can you learn more? Go to http://karli-elenita.info/. Enter R217 in the search box to learn more about \"Cuts Closed With Stitches: Care Instructions. \"  Current as of: September 23, 2018  Content Version: 11.9  © 0828-7095 [employer]. Care instructions adapted under license by [employer] (which disclaims liability or warranty for this information). If you have questions about a medical condition or this instruction, always ask your healthcare professional. [place] [address] any warranty or liability for your use of this information.

## 2019-04-01 NOTE — ED PROVIDER NOTES
Hamlet Fontaine is a 63 y.o. male  who presents by private vehicle to ER with c/o Patient presents with: 
Laceration Head Injury Patient presents with laceration to forehead after getting up from bed to go to the bathroom and falling, hitting his head on the bed post. Patient is unsure of LOC. Denies nausea or vomiting. Denies neck or back pain. Patient ambualtory after fall. He specifically denies any fevers, chills, nausea, vomiting, chest pain, shortness of breath, rash, diarrhea, abdominal pain, urinary/bowel changes, sweating or weight loss. PCP: Bouchra Aly,   
PMHx significant for: Past Medical History: 
No date: Arthritis No date: Chronic pain PSHx significant for: Past Surgical History: 
No date: ABDOMEN SURGERY PROC UNLISTED No date: HX ENDOSCOPY No date: HX HEENT Comment:  FACE-TRAUMA No date: HX ORTHOPAEDIC Comment:  spinal surgery No date: HX OTHER SURGICAL Comment:  inguinal hernia Social Hx: Tobacco use: Social History Tobacco Use Smoking status: Current Every Day Smoker Smokeless tobacco: Never Used 
; EtOH use: The patient states he drinks 0 per week.; Illicit Drug use: Allergies: 
No Known Allergies There are no other complaints, changes or physical findings at this time. Past Medical History:  
Diagnosis Date  Arthritis  Chronic pain Past Surgical History:  
Procedure Laterality Date [address] UNLISTED  HX ENDOSCOPY    
 HX HEENT    
 FACE-TRAUMA  HX ORTHOPAEDIC    
 spinal surgery  HX OTHER SURGICAL    
 inguinal hernia Family History:  
Problem Relation Age of Onset [place] Other Mother ALS  Arthritis-osteo Father  Anesth Problems Neg Hx Social History Socioeconomic History  Marital status:  Spouse name: Not on file  Number of children: Not on file  Years of education: Not on file  Highest education level: Not on file Occupational History  Not on file Social Needs  Financial resource strain: Not on file  Food insecurity:  
  Worry: Not on file Inability: Not on file  Transportation needs:  
  Medical: Not on file Non-medical: Not on file Tobacco Use  Smoking status: Current Every Day Smoker  Smokeless tobacco: Never Used Substance and Sexual Activity  Alcohol use: Yes Comment: OCCASIONAL  
 Drug use: No  
 Sexual activity: Not on file Lifestyle  Physical activity:  
  Days per week: Not on file Minutes per session: Not on file  Stress: Not on file Relationships  Social connections:  
  Talks on phone: Not on file Gets together: Not on file Attends [employer] service: Not on file Active member of club or organization: Not on file Attends meetings of clubs or organizations: Not on file Relationship status: Not on file  Intimate partner violence:  
  Fear of current or ex partner: Not on file Emotionally abused: Not on file Physically abused: Not on file Forced sexual activity: Not on file Other Topics Concern  Not on file Social History Narrative  Not on file ALLERGIES: Patient has no known allergies. Review of Systems Constitutional: Negative for activity change, appetite change, chills and fever. HENT: Negative for congestion and sore throat. Respiratory: Negative for cough and shortness of breath. Cardiovascular: Negative for chest pain. Gastrointestinal: Negative for abdominal pain, diarrhea, nausea and vomiting. Genitourinary: Negative for dysuria. Musculoskeletal: Negative for arthralgias and myalgias. Skin: Positive for wound. Negative for color change. Neurological: Positive for headaches. Negative for dizziness. Psychiatric/Behavioral: The patient is not nervous/anxious. All other systems reviewed and are negative. Vitals:  
 03/31/19 2217 03/31/19 2254 BP: (!) 167/100 152/68 Pulse: 78 Resp: 20 Temp: 97.6 °F (36.4 °C) SpO2: 94% 94% Weight: 99.8 kg (220 lb) Height: 6' 1\" (1.854 m) Physical Exam  
Constitutional: He is oriented to person, place, and time. He appears well-developed and well-nourished. HENT:  
Head: Normocephalic. Right Ear: External ear normal.  
Left Ear: External ear normal.  
Mouth/Throat: Oropharynx is clear and moist. No oropharyngeal exudate. Eyes: Pupils are equal, round, and reactive to light. Conjunctivae and EOM are normal. Right eye exhibits no discharge. Left eye exhibits no discharge. Right conjunctiva is not injected. Right conjunctiva has no hemorrhage. Left conjunctiva is not injected. Left conjunctiva has no hemorrhage. No scleral icterus. Right eye exhibits normal extraocular motion and no nystagmus. Left eye exhibits no nystagmus. Right pupil is round and reactive. Left pupil is round and reactive. Pupils are equal.  
Neck: Normal range of motion. Neck supple. No tracheal deviation present. No thyromegaly present. Cardiovascular: Normal rate, regular rhythm, normal heart sounds and intact distal pulses. No murmur heard. Pulmonary/Chest: Effort normal and breath sounds normal. No respiratory distress. He has no wheezes. He has no rales. Abdominal: Soft. Bowel sounds are normal. He exhibits no distension. There is no tenderness. There is no rebound and no guarding. Musculoskeletal: Normal range of motion. He exhibits no edema or tenderness. Lymphadenopathy:  
  He has no cervical adenopathy. Neurological: He is alert and oriented to person, place, and time. He has normal strength. No cranial nerve deficit. He displays a negative Romberg sign. Coordination normal.  
Skin: Skin is warm. No rash noted. No erythema. Psychiatric: He has a normal mood and affect. His behavior is normal. Judgment and thought content normal.  
Nursing note and vitals reviewed. MDM Number of Diagnoses or Management Options Facial laceration, initial encounter:  
Injury of head, initial encounter:  
Diagnosis management comments: Assesment/Plan- 63 y.o. Patient presents with: 
Laceration Head Injury 
differential includes: head injury, traumatic brain injury, syncope, laceration. Labs and imaging reviewed with no traumatic brain injury on CT. Wound cleaned and well approximated with sutures. Recommend PCP follow up. Patient educated on reasons to return to the ED. Wound Repair 
Date/Time: 3/31/2019 11:45 PM 
Performed by: [id number] provider: Dr. Fishman Innocent Preparation: skin prepped with Shur-Clens Pre-procedure re-eval: Immediately prior to the procedure, the patient was reevaluated and found suitable for the planned procedure and any planned medications. Time out: Immediately prior to the procedure a time out was called to verify the correct patient, procedure, equipment, staff and marking as appropriate. Hillary Gearing Location details: face Wound length:2.6 - 7.5 cm Anesthesia: local infiltration Anesthesia: 
Local Anesthetic: LET (lido,epi,tetracaine) Anesthetic total: 2 mL Foreign bodies: no foreign bodies Debridement: none Skin closure: 6-0 nylon Number of sutures: 14 Technique: simple Approximation: close Dressing: 4x4 Patient tolerance: Patient tolerated the procedure well with no immediate complications My total time at bedside, performing this procedure was 31-45 minutes.

## 2019-04-01 NOTE — ED NOTES
Discharge instructions, prescription and work note given to pt by NP. Pt verbalizes understanding of discharge. A&Ox4, with steady gait.

## 2019-04-01 NOTE — ED TRIAGE NOTES
Pt arrives with c/o of laceration to head pt was getting out of bed to go to the restroom and fell and thinks hit head on bed post, pt denies any loss of consciousness, bleeding is controlled in triage, laceration is about three inches open, pt reports some spotty vision to left eye.

## 2021-03-11 NOTE — PATIENT INSTRUCTIONS
